# Patient Record
Sex: FEMALE | Race: WHITE | NOT HISPANIC OR LATINO | Employment: OTHER | URBAN - METROPOLITAN AREA
[De-identification: names, ages, dates, MRNs, and addresses within clinical notes are randomized per-mention and may not be internally consistent; named-entity substitution may affect disease eponyms.]

---

## 2022-09-26 ENCOUNTER — OFFICE VISIT (OUTPATIENT)
Dept: URGENT CARE | Facility: CLINIC | Age: 77
End: 2022-09-26
Payer: MEDICARE

## 2022-09-26 VITALS
HEIGHT: 62 IN | TEMPERATURE: 97.1 F | DIASTOLIC BLOOD PRESSURE: 79 MMHG | SYSTOLIC BLOOD PRESSURE: 176 MMHG | HEART RATE: 107 BPM | WEIGHT: 210 LBS | OXYGEN SATURATION: 98 % | BODY MASS INDEX: 38.64 KG/M2

## 2022-09-26 DIAGNOSIS — R10.9 FLANK PAIN: Primary | ICD-10-CM

## 2022-09-26 LAB
SL AMB  POCT GLUCOSE, UA: NEGATIVE
SL AMB LEUKOCYTE ESTERASE,UA: NEGATIVE
SL AMB POCT BILIRUBIN,UA: NEGATIVE
SL AMB POCT BLOOD,UA: NEGATIVE
SL AMB POCT CLARITY,UA: CLEAR
SL AMB POCT COLOR,UA: YELLOW
SL AMB POCT KETONES,UA: NEGATIVE
SL AMB POCT NITRITE,UA: NEGATIVE
SL AMB POCT PH,UA: 6
SL AMB POCT SPECIFIC GRAVITY,UA: 1.01
SL AMB POCT URINE PROTEIN: NEGATIVE
SL AMB POCT UROBILINOGEN: 0.2

## 2022-09-26 PROCEDURE — G0463 HOSPITAL OUTPT CLINIC VISIT: HCPCS | Performed by: EMERGENCY MEDICINE

## 2022-09-26 PROCEDURE — 81002 URINALYSIS NONAUTO W/O SCOPE: CPT | Performed by: EMERGENCY MEDICINE

## 2022-09-26 PROCEDURE — 99213 OFFICE O/P EST LOW 20 MIN: CPT | Performed by: EMERGENCY MEDICINE

## 2022-09-26 RX ORDER — KETOROLAC TROMETHAMINE 30 MG/ML
15 INJECTION, SOLUTION INTRAMUSCULAR; INTRAVENOUS ONCE
Status: COMPLETED | OUTPATIENT
Start: 2022-09-26 | End: 2022-09-26

## 2022-09-26 RX ORDER — LEVOTHYROXINE SODIUM 112 UG/1
112 TABLET ORAL DAILY
COMMUNITY
Start: 2022-09-15

## 2022-09-26 RX ORDER — VITAMIN E 268 MG
400 CAPSULE ORAL DAILY
COMMUNITY

## 2022-09-26 RX ORDER — AMLODIPINE BESYLATE 10 MG/1
10 TABLET ORAL DAILY
COMMUNITY
Start: 2022-09-15

## 2022-09-26 RX ORDER — NIACINAMIDE 500 MG
1000 TABLET ORAL DAILY
COMMUNITY

## 2022-09-26 RX ORDER — PSEUDOEPHEDRINE HCL 30 MG
100 TABLET ORAL DAILY
COMMUNITY

## 2022-09-26 RX ADMIN — KETOROLAC TROMETHAMINE 15 MG: 30 INJECTION, SOLUTION INTRAMUSCULAR; INTRAVENOUS at 15:55

## 2022-09-26 NOTE — PATIENT INSTRUCTIONS
Flank Pain   WHAT YOU NEED TO KNOW:   Flank pain is felt in the area below your ribcage and above your hip bones, often in the lower back  Your pain may be dull or so severe that you cannot get comfortable  The pain may stay in one area or radiate to another area  It may worsen and lighten in waves  Flank pain is often a sign of problems with your urinary tract, such as a kidney stone or infection  DISCHARGE INSTRUCTIONS:   Return to the emergency department if:   You have a fever  Your heart is fluttering or jumping  You see blood in your urine  Your pain radiates into your lower abdomen and genital area  You have intense pain in your low back next to your spine  You are much more tired than usual and have no desire to eat  You have a headache and your muscles jerk  Contact your healthcare provider if:   You have an upset stomach and are vomiting  You have to urinate more often, and with urgency  Your pain worsens or does not improve, and you cannot get comfortable  You pass a stone when you urinate  You have questions or concerns about your condition or care  Medicines: The following medicines may be ordered for you:  Pain medicine  may help decrease or relieve your pain  Do not wait until the pain is severe before you take your medicine  Antibiotics  may help treat a urinary tract infection caused by bacteria  Take your medicine as directed  Contact your healthcare provider if you think your medicine is not helping or if you have side effects  Tell him of her if you are allergic to any medicine  Keep a list of the medicines, vitamins, and herbs you take  Include the amounts, and when and why you take them  Bring the list or the pill bottles to follow-up visits  Carry your medicine list with you in case of an emergency      Follow up with your healthcare provider in 1 to 2 days or as directed:  Write down your questions so you remember to ask them during your visits  © Copyright [employer] 2022 Information is for End User's use only and may not be sold, redistributed or otherwise used for commercial purposes  All illustrations and images included in CareNotes® are the copyrighted property of [employer]  or Laith [employer]  The above information is an  only  It is not intended as medical advice for individual conditions or treatments  Talk to your doctor, nurse or pharmacist before following any medical regimen to see if it is safe and effective for you

## 2022-09-26 NOTE — PROGRESS NOTES
[facility]        NAME: Ivis Bear is a 76 y o  female  : 1945    MRN: [id number]  DATE: 2022  TIME: 3:36 PM    Assessment and Plan   No primary diagnosis found  No diagnosis found  Patient Instructions     There are no Patient Instructions on file for this visit  Follow up with PCP in 3-5 days  Proceed to  ER if symptoms worsen  Chief Complaint     Chief Complaint   Patient presents with    Possible UTI     C/o pain right flank into groin  Onset this AM  Reports "not Peeing the way I should be " Voided only small amount  History of Present Illness       Patient complains of awakening with right flank pain with radiation to groin  this morning, pain waxing waning since and now notes she is only able to void very small amounts despite drinking a lot of fluids  She denies history of kidney stones  She denies history of urinary retention  She denies fever or chills  She is in this area camping with her  and wants to go to [facility] instead of our nearby ER  Review of Systems   Review of Systems   Constitutional: Negative for chills and fever  HENT: Negative for trouble swallowing and voice change  Respiratory: Negative for chest tightness, shortness of breath and wheezing  Cardiovascular: Negative for chest pain  Genitourinary: Positive for difficulty urinating, dysuria and flank pain  Negative for hematuria and urgency  Musculoskeletal: Negative for back pain           Current Medications       Current Outpatient Medications:     amLODIPine (NORVASC) 10 mg tablet, Take 10 mg by mouth in the morning, Disp: , Rfl:     ascorbic acid (VITAMIN C) 1000 MG tablet, Take 1,000 mg by mouth in the morning, Disp: , Rfl:     B COMPLEX VITAMINS PO, Take 1 tablet by mouth daily, Disp: , Rfl:     Cholecalciferol 125 MCG (5000 UT) TABS, Take 5,000 Units by mouth in the morning, Disp: , Rfl:     Docusate Sodium (DSS) 100 MG CAPS, Take 100 mg by mouth in the morning, Disp: , Rfl:     levothyroxine 112 mcg tablet, Take 112 mcg by mouth in the morning, Disp: , Rfl:     niacinamide 500 mg tablet, Take 1,000 mg by mouth in the morning, Disp: , Rfl:     vitamin E, tocopherol, 400 units capsule, Take 400 Units by mouth in the morning, Disp: , Rfl:     Current Allergies     Allergies as of 09/26/2022 - Reviewed 09/26/2022   Allergen Reaction Noted    Nitrous oxide Shortness Of Breath 12/05/2016    Shellfish-derived products - food allergy Anaphylaxis 12/05/2016    Chromium Nausea Only 12/05/2016    Erythromycin base GI Intolerance 09/26/2022    Prednisone Palpitations and Tachycardia 12/05/2016    Cephalexin Other (See Comments) 12/05/2016            The following portions of the patient's history were reviewed and updated as appropriate: allergies, current medications, past family history, past medical history, past social history, past surgical history and problem list      Past Medical History:   Diagnosis Date    Disease of thyroid gland     Hypertension        Past Surgical History:   Procedure Laterality Date    CHOLECYSTECTOMY      THYROID SURGERY      TONSILLECTOMY         Family History   Problem Relation Age of Onset    Diabetes Mother     Diabetes Father          Medications have been verified  Objective   BP (!) 176/79   Pulse (!) 107   Temp (!) 97 1 °F (36 2 °C)   Ht 5' 2" (1 575 m)   Wt 95 3 kg (210 lb)   SpO2 98%   BMI 38 41 kg/m²        Physical Exam     Physical Exam  Vitals and nursing note reviewed  Constitutional:       General: She is not in acute distress  Appearance: She is well-developed  She is not ill-appearing, toxic-appearing or diaphoretic  Abdominal:      General: Bowel sounds are normal  There is no distension  Palpations: Abdomen is soft  There is no mass  Tenderness: There is no abdominal tenderness  There is no right CVA tenderness, left CVA tenderness, guarding or rebound  Skin:     General: Skin is warm and dry  Findings: No rash  Neurological:      Mental Status: She is alert and oriented to person, place, and time  Psychiatric:         Mood and Affect: Mood normal          Behavior: Behavior normal          Thought Content:  Thought content normal          Judgment: Judgment normal